# Patient Record
Sex: MALE | Race: WHITE | Employment: FULL TIME | URBAN - METROPOLITAN AREA
[De-identification: names, ages, dates, MRNs, and addresses within clinical notes are randomized per-mention and may not be internally consistent; named-entity substitution may affect disease eponyms.]

---

## 2021-04-29 RX ORDER — AMLODIPINE BESYLATE 10 MG/1
TABLET ORAL
Qty: 30 TAB | Refills: 5 | Status: SHIPPED | OUTPATIENT
Start: 2021-04-29 | End: 2021-04-30 | Stop reason: SDUPTHER

## 2021-04-30 ENCOUNTER — VIRTUAL VISIT (OUTPATIENT)
Dept: INTERNAL MEDICINE CLINIC | Age: 36
End: 2021-04-30
Payer: COMMERCIAL

## 2021-04-30 DIAGNOSIS — I10 BENIGN ESSENTIAL HTN: Primary | ICD-10-CM

## 2021-04-30 PROCEDURE — 99442 PR PHYS/QHP TELEPHONE EVALUATION 11-20 MIN: CPT | Performed by: INTERNAL MEDICINE

## 2021-04-30 RX ORDER — AMLODIPINE BESYLATE 10 MG/1
TABLET ORAL
Qty: 90 TAB | Refills: 1 | Status: SHIPPED | OUTPATIENT
Start: 2021-04-30 | End: 2021-07-01 | Stop reason: SDUPTHER

## 2021-04-30 NOTE — PROGRESS NOTES
Jeramiecheo Bashir presents today for   Chief Complaint   Patient presents with    Follow-up     hypertension    Medication Refill       Depression Screening:  3 most recent PHQ Screens 4/30/2021   Little interest or pleasure in doing things Not at all   Feeling down, depressed, irritable, or hopeless Not at all   Total Score PHQ 2 0       Learning Assessment:  No flowsheet data found. Health Maintenance reviewed and discussed and ordered per Provider. Health Maintenance Due   Topic Date Due    Hepatitis C Screening  Never done    COVID-19 Vaccine (1) Never done   . Coordination of Care:  1. Have you been to the ER, urgent care clinic since your last visit? Hospitalized since your last visit? no    2. Have you seen or consulted any other health care providers outside of the [address] [place] [address] since your last visit? Include any pap smears or colon screening.  no

## 2021-04-30 NOTE — PROGRESS NOTES
I was at my office in [place], [place] while conducting this encounter. The patient is at home. Consent:  Patient and/or HIS/HER healthcare decision maker is aware that this patient-initiated Telehealth encounter is a billable service, with coverage as determined by patient's insurance carrier. Patient is aware that He/She may receive a bill and has provided verbal consent to proceed: Consent has been obtained within past 12 months of the date of this encounter. This virtual visit was conducted via Telephone    1. Benign essential HTN  Who has not been checking his blood pressure. We will check a CMP I am going to renew his Norvasc. I have asked him to make sure he schedules an in office appointment for his annual physical.  That way we can revisit his alcohol use and check his blood pressure. He will also be due for a lipid profile but will hold off on ordering this  - METABOLIC PANEL, COMPREHENSIVE  - amLODIPine (NORVASC) 10 mg tablet; TAKE 1 TABLET BY MOUTH DAILY  Dispense: 90 Tab; Refill: 1     All recent labs have been reviewed including notes from [facility] from 2017  Chief Complaint   Patient presents with    Follow-up     hypertension    Medication Refill        HPI   This is a pleasant 71-year-old gentleman with a history of hypertension who presents in follow-up. He was on the verge of running out of blood pressure medicine. He denies any headache vision changes chest pain palpitation shortness of breath nausea vomiting diarrhea or leg swelling. He reports he has been doing great and has no complaints. He and I had a good discussion about the need for him to follow-up and have his annual physical and he is agreed to do so. He has checked his blood pressure couple times but it has been quite a while and he does not remember the top numbers  No current outpatient medications on file prior to visit. No current facility-administered medications on file prior to visit.          There is no problem list on file for this patient.          Total Time Spent on this Encounter: approx 12 minutes spent during this encounter

## 2021-07-01 ENCOUNTER — OFFICE VISIT (OUTPATIENT)
Dept: INTERNAL MEDICINE CLINIC | Age: 36
End: 2021-07-01
Payer: COMMERCIAL

## 2021-07-01 VITALS
SYSTOLIC BLOOD PRESSURE: 136 MMHG | RESPIRATION RATE: 18 BRPM | TEMPERATURE: 98.2 F | HEART RATE: 71 BPM | BODY MASS INDEX: 24.44 KG/M2 | WEIGHT: 174.6 LBS | OXYGEN SATURATION: 99 % | HEIGHT: 71 IN | DIASTOLIC BLOOD PRESSURE: 88 MMHG

## 2021-07-01 DIAGNOSIS — F10.230 ALCOHOL DEPENDENCE WITH UNCOMPLICATED WITHDRAWAL (HCC): Primary | ICD-10-CM

## 2021-07-01 DIAGNOSIS — Z00.00 ANNUAL PHYSICAL EXAM: ICD-10-CM

## 2021-07-01 DIAGNOSIS — I10 BENIGN ESSENTIAL HTN: ICD-10-CM

## 2021-07-01 DIAGNOSIS — Z72.0 TOBACCO ABUSE: ICD-10-CM

## 2021-07-01 PROCEDURE — 99395 PREV VISIT EST AGE 18-39: CPT | Performed by: INTERNAL MEDICINE

## 2021-07-01 PROCEDURE — 99214 OFFICE O/P EST MOD 30 MIN: CPT | Performed by: INTERNAL MEDICINE

## 2021-07-01 RX ORDER — AMLODIPINE BESYLATE 10 MG/1
TABLET ORAL
Qty: 90 TABLET | Refills: 1 | Status: SHIPPED | OUTPATIENT
Start: 2021-07-01 | End: 2022-01-03 | Stop reason: SDUPTHER

## 2021-07-01 NOTE — PROGRESS NOTES
1. Benign essential HTN  Blood pressure is fairly controlled here today. He is due for CMP which I will order. We will also order Norvasc 10 mg daily  - amLODIPine (NORVASC) 10 mg tablet; TAKE 1 TABLET BY MOUTH DAILY  Dispense: 90 Tablet; Refill: 1  - METABOLIC PANEL, COMPREHENSIVE    2. Alcohol dependence with uncomplicated withdrawal ([address]  He is in mild withdrawal right now. He and I had a discussion regarding his alcohol use. He is back up to at least 8 drinks per day. He had cut back a bit but unfortunately fell off the wagon. He and I did discuss the possibility of placing him in our detox center here at [facility] but he admitted he is really not interested in that right now. I have told him that once he is ready we are here for him and to reach out to me    3. Annual physical exam  Performed  - LIPID PANEL; Future    4. Tobacco abuse  He successfully quit smoking and I am very proud of him for that unfortunately he started using chewing tobacco.  I again strongly recommended that he stop and discussed the dangers of this including mouth cancer. I have asked that he consider using nicotine gum. Chief Complaint   Patient presents with    Hypertension     follow up        HPI   This is a pleasant 60-year-old gentleman with a history of hypertension and alcohol dependence who presents in follow-up for his high blood pressure and for his annual physical exam.  He reports he has been doing well at work and while at home. He had managed to decrease his alcohol consumption by about 50% but unfortunately fell off the wagon and is back up to drinking 8 drinks per day. He admits that he gets tremulous at times and is actually slightly tremulous now. He denies any chest pain or palpitations. He denies any headaches. He admits when he gets home one of the first things he does as he has a drink. He otherwise reports he has been doing well he has no nausea vomiting or diarrhea.   There is no problem list on file for this patient. No current outpatient medications on file prior to visit. No current facility-administered medications on file prior to visit. ROS  - GEN: no weight gain/loss, no fevers or chills  - HEENT: no vision changes, no tinnitus, no sore throat  - CV: no cp, palpitations or edema  - RESP: no sob, cough  - ABD: no n/v/d, no blood in stool  - Neuro: no resting tremors, parasthesia in extremities, no headaches  - MS: No weakness in extremities, no gait abnormalities        Visit Vitals  /88   Pulse 71   Temp 98.2 °F (36.8 °C)   Resp 18   Ht 5' 11\" (1.803 m)   Wt 174 lb 9.6 oz (79.2 kg)   SpO2 99%   BMI 24.35 kg/m²           Physical Exam  Constitutional:       Appearance: Normal appearance. Normal weight. NAD and pleasant  HENT:      Head: Normocephalic. Nose: Nose normal.      Mouth/Throat:      Mouth: Mucous membranes are moist. Throat not inflammed  Eyes:      Extraocular Movements: Extraocular movements intact. Conjunctiva/sclera: Conjunctivae normal. Sclera anicteric     Cardiovascular:      Rate and Rhythm: Normal rate and regular rhythm. Pulses: Normal pulses. Pulmonary:      Effort: No respiratory distress. Breath sounds: CTAB and No stridor. No rhonchi. Abdominal:      General: There is no distension.  NT, ND I cannot palpate the liver  Neurological:      Mental Status: patient is alert and oriented times 3. + resting tremor, normal gait     Cranial Nerves: cranial nerves grossly intact  Muskuloskeletal     Full ROM in extremities     Normal gait  Skin     Dry without lesions on examined areas, warm to the touch       Deferred  Psychiatry     Calm, normal affect, interacting normally

## 2021-07-01 NOTE — PROGRESS NOTES
Aquilino Ilya presents today for   Chief Complaint   Patient presents with    Hypertension     follow up       Is someone accompanying this pt? noIs the patient using any DME equipment during OV? no    Depression Screening:  3 most recent PHQ Screens 7/1/2021   Little interest or pleasure in doing things Not at all   Feeling down, depressed, irritable, or hopeless Not at all   Total Score PHQ 2 0       Learning Assessment:  No flowsheet data found. Health Maintenance reviewed and discussed and ordered per Provider. Health Maintenance Due   Topic Date Due    Hepatitis C Screening  Never done    COVID-19 Vaccine (1) Never done   . Coordination of Care:  1. Have you been to the ER, urgent care clinic since your last visit? Hospitalized since your last visit? no    2. Have you seen or consulted any other health care providers outside of the [address] [place] [address] since your last visit? Include any pap smears or colon screening.  no

## 2021-07-07 ENCOUNTER — HOSPITAL ENCOUNTER (OUTPATIENT)
Dept: LAB | Age: 36
Discharge: HOME | End: 2021-07-07
Payer: COMMERCIAL

## 2021-07-07 DIAGNOSIS — Z00.00 ANNUAL PHYSICAL EXAM: ICD-10-CM

## 2021-07-07 LAB
ALBUMIN SERPL-MCNC: 4.5 G/DL (ref 3.5–4.7)
ALBUMIN/GLOB SERPL: 1.2 {RATIO}
ALP SERPL-CCNC: 79 U/L (ref 38–126)
ALT SERPL-CCNC: 89 U/L (ref 3–72)
ANION GAP SERPL CALC-SCNC: 11 MMOL/L
AST SERPL W P-5'-P-CCNC: 93 U/L (ref 17–74)
BILIRUB SERPL-MCNC: 0.6 MG/DL (ref 0.2–1)
BUN SERPL-MCNC: 11 MG/DL (ref 9–21)
BUN/CREAT SERPL: 14
CA-I BLD-MCNC: 9.6 MG/DL (ref 8.5–10.5)
CHLORIDE SERPL-SCNC: 104 MMOL/L (ref 94–111)
CHOLEST SERPL-MCNC: 215 MG/DL
CO2 SERPL-SCNC: 27 MMOL/L (ref 21–33)
CREAT SERPL-MCNC: 0.8 MG/DL (ref 0.8–1.5)
GLOBULIN SER CALC-MCNC: 3.8 G/DL
GLUCOSE SERPL-MCNC: 83 MG/DL (ref 70–110)
HDLC SERPL-MCNC: 67 MG/DL
HDLC SERPL: 3.2 {RATIO} (ref 0–5)
LDLC SERPL CALC-MCNC: 99.4 MG/DL (ref 0–100)
LIPID PROFILE,FLP: ABNORMAL
POTASSIUM SERPL-SCNC: 4 MMOL/L (ref 3.2–5.1)
PROT SERPL-MCNC: 8.3 G/DL (ref 6.1–8.4)
SODIUM SERPL-SCNC: 142 MMOL/L (ref 135–145)
TRIGL SERPL-MCNC: 243 MG/DL (ref ?–150)
VLDLC SERPL CALC-MCNC: 48.6 MG/DL

## 2021-07-07 PROCEDURE — 36415 COLL VENOUS BLD VENIPUNCTURE: CPT

## 2021-07-07 PROCEDURE — 80053 COMPREHEN METABOLIC PANEL: CPT

## 2021-07-07 PROCEDURE — 80061 LIPID PANEL: CPT

## 2021-07-27 ENCOUNTER — VIRTUAL VISIT (OUTPATIENT)
Dept: INTERNAL MEDICINE CLINIC | Age: 36
End: 2021-07-27
Payer: COMMERCIAL

## 2021-07-27 DIAGNOSIS — R74.01 TRANSAMINITIS: ICD-10-CM

## 2021-07-27 DIAGNOSIS — K70.10 ALCOHOLIC HEPATITIS WITHOUT ASCITES: Primary | ICD-10-CM

## 2021-07-27 PROCEDURE — 99442 PR PHYS/QHP TELEPHONE EVALUATION 11-20 MIN: CPT | Performed by: INTERNAL MEDICINE

## 2021-07-27 NOTE — PROGRESS NOTES
Adeola Schroeder presents today for   Chief Complaint   Patient presents with    Results     labs       Depression Screening:  3 most recent PHQ Screens 7/27/2021   Little interest or pleasure in doing things Not at all   Feeling down, depressed, irritable, or hopeless Not at all   Total Score PHQ 2 0       Learning Assessment:  No flowsheet data found. Health Maintenance reviewed and discussed and ordered per Provider. Health Maintenance Due   Topic Date Due    Hepatitis C Screening  Never done    Pneumococcal 0-64 years (1 of 2 - PPSV23) Never done    COVID-19 Vaccine (1) Never done   . Coordination of Care:  1. Have you been to the ER, urgent care clinic since your last visit? Hospitalized since your last visit? no    2. Have you seen or consulted any other health care providers outside of the [address] [place] [address] since your last visit? Include any pap smears or colon screening.  no

## 2021-07-27 NOTE — PROGRESS NOTES
I was at my office in [place], [place] while conducting this encounter. The patient is at home. Consent:  Patient and/or HIS/HER healthcare decision maker is aware that this patient-initiated Telehealth encounter is a billable service, with coverage as determined by patient's insurance carrier. Patient is aware that He/She may receive a bill and has provided verbal consent to proceed: Consent has been obtained within past 12 months of the date of this encounter. This virtual visit was conducted via Telephone    1. Alcoholic hepatitis without ascites  . None I again talked about his alcohol consumption and my recommendation that he quit immediately or at least dramatically cut back. He and I have had this conversation on more than one occasion. He expressed understanding. As usual I told him that I was open whenever he is ready for help    2. Transaminitis  Although I strongly suspect that alcohol is causing his chronic hepatitis we must rule out viral hepatitis as a cause. We will check a viral hepatitis profile  - HEPATITIS PANEL, ACUTE       Chief Complaint   Patient presents with    Results     labs        HPI   This is a very pleasant 80-year-old gentleman with a history of hypertension and heavy alcohol consumption who in the past has had elevated transaminases. He originally presented to me several weeks ago for his annual physical exam.  His labs resulted which showed significant hepatitis. He reports he is drinking approximately 6-8 drinks per night still. He is not interested in cutting back at this time. He denies nausea vomiting or right upper quadrant pain. He denies any itching. When I asked him about the possibility of viral hepatitis he does denies any contacts of anyone he knows he might have hepatitis were recent transfusions. He otherwise reports he is doing well.   He is not interested in any help at this time for his alcohol addiction  Current Outpatient Medications on File Prior to Visit   Medication Sig Dispense Refill    amLODIPine (NORVASC) 10 mg tablet TAKE 1 TABLET BY MOUTH DAILY 90 Tablet 1     No current facility-administered medications on file prior to visit. There is no problem list on file for this patient.             Total Time Spent on this Encounter: 12 minutes spent

## 2022-01-03 DIAGNOSIS — I10 BENIGN ESSENTIAL HTN: ICD-10-CM

## 2022-01-04 ENCOUNTER — VIRTUAL VISIT (OUTPATIENT)
Dept: INTERNAL MEDICINE CLINIC | Age: 37
End: 2022-01-04
Payer: COMMERCIAL

## 2022-01-04 DIAGNOSIS — I10 BENIGN ESSENTIAL HTN: ICD-10-CM

## 2022-01-04 DIAGNOSIS — I10 ESSENTIAL HYPERTENSION: Primary | ICD-10-CM

## 2022-01-04 DIAGNOSIS — R74.01 TRANSAMINITIS: ICD-10-CM

## 2022-01-04 DIAGNOSIS — F10.90 HEAVY ALCOHOL USE: ICD-10-CM

## 2022-01-04 DIAGNOSIS — U07.1 COVID-19: ICD-10-CM

## 2022-01-04 PROCEDURE — 99442 PR PHYS/QHP TELEPHONE EVALUATION 11-20 MIN: CPT | Performed by: INTERNAL MEDICINE

## 2022-01-04 RX ORDER — AMLODIPINE BESYLATE 10 MG/1
TABLET ORAL
Qty: 90 TABLET | Refills: 1 | Status: SHIPPED | OUTPATIENT
Start: 2022-01-04 | End: 2022-01-04 | Stop reason: SDUPTHER

## 2022-01-04 RX ORDER — AMLODIPINE BESYLATE 10 MG/1
TABLET ORAL
Qty: 90 TABLET | Refills: 1 | Status: SHIPPED | OUTPATIENT
Start: 2022-01-04

## 2022-01-04 NOTE — PROGRESS NOTES
I was at my office in [address] while conducting this encounter. The patient is at home. Consent:  Patient and/or HIS/HER healthcare decision maker is aware that this patient-initiated Telehealth encounter is a billable service, with coverage as determined by patient's insurance carrier. Patient is aware that He/She may receive a bill and has provided verbal consent to proceed: Consent has been obtained within past 12 months of the date of this encounter. This virtual visit was conducted via Telephone    1. Essential hypertension  He reports he has been compliant with his blood pressure medications and overall reports he is doing well. I am going to check a CMP  - METABOLIC PANEL, COMPREHENSIVE  - amLODIPine (NORVASC) 10 mg tablet; TAKE 1 TABLET BY MOUTH DAILY  Dispense: 90 Tablet; Refill: 1    2. COVID-19  He reports a direct exposure to COVID-19 over the holidays now he is lost his sense of smell and also has sinus congestion. I have referred him for COVID-19 testing at the satellite lab    3. Heavy alcohol use  He continues to drink a 12 pack/day and I again explained to him the dangers of continuing this behavior especially when it comes to his liver injury. He expressed understanding but is still not willing to cut back further  - METABOLIC PANEL, COMPREHENSIVE    4. Transaminitis  Echo CMP. His transaminitis is due to alcohol  - METABOLIC PANEL, COMPREHENSIVE         Chief Complaint   Patient presents with    Follow-up     6 month follow up        HPI   This is a very pleasant 52-year-old gentleman with a history of heavy alcohol use and hypertension who presents for follow-up. He also reports he had a contact with someone who had Covid about a week ago and now has developed postnasal drip loss of sense of smell and taste. He denies chest pain or chest pressure or shortness of breath. He denies any muscle aches or high fevers.   He otherwise reports he has been doing well but admits he is continues to drink a 12 pack of beer per day. He and I have had good discussions regarding the need for him to cut back and while he has cut back a little bit he is still consuming a 12 pack/day. He denies any epigastric discomfort nausea vomiting or diarrhea. No current outpatient medications on file prior to visit. No current facility-administered medications on file prior to visit. There is no problem list on file for this patient.             Total Time Spent on this Encounter: total time spent was approx 12 minutes

## 2022-01-04 NOTE — PROGRESS NOTES
Shaunna Wells presents today for   Chief Complaint   Patient presents with    Follow-up     6 month follow up       Depression Screening:  3 most recent PHQ Screens 1/4/2022   Little interest or pleasure in doing things Not at all   Feeling down, depressed, irritable, or hopeless Not at all   Total Score PHQ 2 0       Learning Assessment:  No flowsheet data found. Health Maintenance reviewed and discussed and ordered per Provider. Health Maintenance Due   Topic Date Due    Hepatitis C Screening  Never done    COVID-19 Vaccine (1) Never done    Pneumococcal 0-64 years (1 of 2 - PPSV23) Never done    Flu Vaccine (1) Never done   . Coordination of Care:  1. \"Have you been to the ER, urgent care clinic since your last visit? Hospitalized since your last visit? \" No    2. \"Have you seen or consulted any other health care providers outside of the [address] [place] [address] since your last visit? \" No     3. For patients aged 39-70: Has the patient had a colonoscopy? NA based on age or sex     If the patient is female:    3. For patients aged 41-77: Has the patient had a mammogram within the past 2 years? NA based on age or sex    11. For patients aged 21-65: Has the patient had a pap smear?  NA based on age or sex